# Patient Record
Sex: MALE | Race: WHITE | NOT HISPANIC OR LATINO | Employment: UNEMPLOYED | URBAN - METROPOLITAN AREA
[De-identification: names, ages, dates, MRNs, and addresses within clinical notes are randomized per-mention and may not be internally consistent; named-entity substitution may affect disease eponyms.]

---

## 2017-01-16 ENCOUNTER — OFFICE VISIT (OUTPATIENT)
Dept: OCCUPATIONAL MEDICINE | Facility: CLINIC | Age: 62
End: 2017-01-16
Payer: OTHER MISCELLANEOUS

## 2017-01-16 ENCOUNTER — HOSPITAL ENCOUNTER (OUTPATIENT)
Dept: RADIOLOGY | Facility: CLINIC | Age: 62
Discharge: HOME | End: 2017-01-16
Payer: OTHER MISCELLANEOUS

## 2017-01-16 ENCOUNTER — TRANSCRIBE ORDERS (OUTPATIENT)
Dept: OCCUPATIONAL MEDICINE | Facility: CLINIC | Age: 62
End: 2017-01-16

## 2017-01-16 DIAGNOSIS — M54.50 ACUTE MIDLINE LOW BACK PAIN WITHOUT SCIATICA: ICD-10-CM

## 2017-01-16 DIAGNOSIS — M54.50 ACUTE MIDLINE LOW BACK PAIN WITHOUT SCIATICA: Primary | ICD-10-CM

## 2017-01-16 PROCEDURE — 72100 X-RAY EXAM L-S SPINE 2/3 VWS: CPT

## 2017-01-16 PROCEDURE — 99203 OFFICE O/P NEW LOW 30 MIN: CPT

## 2017-01-18 ENCOUNTER — APPOINTMENT (OUTPATIENT)
Dept: OCCUPATIONAL MEDICINE | Facility: CLINIC | Age: 62
End: 2017-01-18
Payer: OTHER MISCELLANEOUS

## 2017-01-18 PROCEDURE — 99213 OFFICE O/P EST LOW 20 MIN: CPT

## 2018-03-15 ENCOUNTER — APPOINTMENT (OUTPATIENT)
Dept: URGENT CARE | Facility: CLINIC | Age: 63
End: 2018-03-15
Payer: OTHER MISCELLANEOUS

## 2018-03-15 PROCEDURE — G0382 LEV 3 HOSP TYPE B ED VISIT: HCPCS | Performed by: EMERGENCY MEDICINE

## 2018-03-15 PROCEDURE — 99283 EMERGENCY DEPT VISIT LOW MDM: CPT | Performed by: EMERGENCY MEDICINE

## 2018-03-20 ENCOUNTER — APPOINTMENT (OUTPATIENT)
Dept: URGENT CARE | Facility: CLINIC | Age: 63
End: 2018-03-20
Payer: OTHER MISCELLANEOUS

## 2018-03-20 PROCEDURE — 99213 OFFICE O/P EST LOW 20 MIN: CPT

## 2020-09-18 ENCOUNTER — HOSPITAL ENCOUNTER (EMERGENCY)
Facility: HOSPITAL | Age: 65
Discharge: HOME | End: 2020-09-18
Attending: EMERGENCY MEDICINE | Admitting: EMERGENCY MEDICINE
Payer: MEDICARE

## 2020-09-18 VITALS
WEIGHT: 200 LBS | SYSTOLIC BLOOD PRESSURE: 159 MMHG | OXYGEN SATURATION: 98 % | HEART RATE: 70 BPM | RESPIRATION RATE: 18 BRPM | DIASTOLIC BLOOD PRESSURE: 89 MMHG | TEMPERATURE: 97.8 F

## 2020-09-18 DIAGNOSIS — R41.3 MEMORY LOSS: ICD-10-CM

## 2020-09-18 DIAGNOSIS — S60.512A ABRASION OF LEFT HAND, INITIAL ENCOUNTER: ICD-10-CM

## 2020-09-18 DIAGNOSIS — F20.9 SCHIZOPHRENIA (HCC): ICD-10-CM

## 2020-09-18 DIAGNOSIS — R45.1 AGITATION: Primary | ICD-10-CM

## 2020-09-18 LAB
ALBUMIN SERPL BCP-MCNC: 3.6 G/DL (ref 3.5–5)
ALP SERPL-CCNC: 59 U/L (ref 46–116)
ALT SERPL W P-5'-P-CCNC: 53 U/L (ref 12–78)
AMPHETAMINES SERPL QL SCN: NEGATIVE
ANION GAP SERPL CALCULATED.3IONS-SCNC: 2 MMOL/L (ref 4–13)
AST SERPL W P-5'-P-CCNC: 32 U/L (ref 5–45)
BARBITURATES UR QL: NEGATIVE
BASOPHILS # BLD AUTO: 0.01 THOUSANDS/ΜL (ref 0–0.1)
BASOPHILS NFR BLD AUTO: 0 % (ref 0–1)
BENZODIAZ UR QL: NEGATIVE
BILIRUB SERPL-MCNC: 0.4 MG/DL (ref 0.2–1)
BUN SERPL-MCNC: 14 MG/DL (ref 5–25)
CALCIUM SERPL-MCNC: 8.8 MG/DL (ref 8.3–10.1)
CHLORIDE SERPL-SCNC: 107 MMOL/L (ref 100–108)
CLARITY, POC: CLEAR
CO2 SERPL-SCNC: 32 MMOL/L (ref 21–32)
COCAINE UR QL: NEGATIVE
COLOR, POC: YELLOW
CREAT SERPL-MCNC: 1.08 MG/DL (ref 0.6–1.3)
EOSINOPHIL # BLD AUTO: 0 THOUSAND/ΜL (ref 0–0.61)
EOSINOPHIL NFR BLD AUTO: 0 % (ref 0–6)
ERYTHROCYTE [DISTWIDTH] IN BLOOD BY AUTOMATED COUNT: 13.7 % (ref 11.6–15.1)
ETHANOL SERPL-MCNC: <3 MG/DL (ref 0–3)
EXT BILIRUBIN, UA: NEGATIVE
EXT BLOOD URINE: NEGATIVE
EXT GLUCOSE, UA: NEGATIVE
EXT KETONES: NEGATIVE
EXT NITRITE, UA: NEGATIVE
EXT PH, UA: 6.5
EXT PROTEIN, UA: NEGATIVE
EXT SPECIFIC GRAVITY, UA: 1.01
EXT UROBILINOGEN: 0.2
GFR SERPL CREATININE-BSD FRML MDRD: 72 ML/MIN/1.73SQ M
GLUCOSE SERPL-MCNC: 75 MG/DL (ref 65–140)
HCT VFR BLD AUTO: 41.6 % (ref 36.5–49.3)
HGB BLD-MCNC: 13.9 G/DL (ref 12–17)
IMM GRANULOCYTES # BLD AUTO: 0 THOUSAND/UL (ref 0–0.2)
IMM GRANULOCYTES NFR BLD AUTO: 0 % (ref 0–2)
LYMPHOCYTES # BLD AUTO: 0.95 THOUSANDS/ΜL (ref 0.6–4.47)
LYMPHOCYTES NFR BLD AUTO: 26 % (ref 14–44)
MCH RBC QN AUTO: 31.3 PG (ref 26.8–34.3)
MCHC RBC AUTO-ENTMCNC: 33.4 G/DL (ref 31.4–37.4)
MCV RBC AUTO: 94 FL (ref 82–98)
METHADONE UR QL: NEGATIVE
MONOCYTES # BLD AUTO: 0.28 THOUSAND/ΜL (ref 0.17–1.22)
MONOCYTES NFR BLD AUTO: 8 % (ref 4–12)
NEUTROPHILS # BLD AUTO: 2.38 THOUSANDS/ΜL (ref 1.85–7.62)
NEUTS SEG NFR BLD AUTO: 66 % (ref 43–75)
OPIATES UR QL SCN: NEGATIVE
OXYCODONE+OXYMORPHONE UR QL SCN: NEGATIVE
PCP UR QL: NEGATIVE
PLATELET # BLD AUTO: 190 THOUSANDS/UL (ref 149–390)
PMV BLD AUTO: 9 FL (ref 8.9–12.7)
POTASSIUM SERPL-SCNC: 4.1 MMOL/L (ref 3.5–5.3)
PROT SERPL-MCNC: 6.2 G/DL (ref 6.4–8.2)
RBC # BLD AUTO: 4.44 MILLION/UL (ref 3.88–5.62)
SODIUM SERPL-SCNC: 141 MMOL/L (ref 136–145)
THC UR QL: NEGATIVE
TSH SERPL DL<=0.05 MIU/L-ACNC: 0.47 UIU/ML (ref 0.36–3.74)
WBC # BLD AUTO: 3.62 THOUSAND/UL (ref 4.31–10.16)
WBC # BLD EST: NEGATIVE 10*3/UL

## 2020-09-18 PROCEDURE — 90471 IMMUNIZATION ADMIN: CPT

## 2020-09-18 PROCEDURE — 84443 ASSAY THYROID STIM HORMONE: CPT | Performed by: EMERGENCY MEDICINE

## 2020-09-18 PROCEDURE — 85025 COMPLETE CBC W/AUTO DIFF WBC: CPT | Performed by: EMERGENCY MEDICINE

## 2020-09-18 PROCEDURE — 80320 DRUG SCREEN QUANTALCOHOLS: CPT | Performed by: EMERGENCY MEDICINE

## 2020-09-18 PROCEDURE — 93005 ELECTROCARDIOGRAM TRACING: CPT

## 2020-09-18 PROCEDURE — 99285 EMERGENCY DEPT VISIT HI MDM: CPT

## 2020-09-18 PROCEDURE — 80307 DRUG TEST PRSMV CHEM ANLYZR: CPT | Performed by: EMERGENCY MEDICINE

## 2020-09-18 PROCEDURE — 36415 COLL VENOUS BLD VENIPUNCTURE: CPT | Performed by: EMERGENCY MEDICINE

## 2020-09-18 PROCEDURE — 90715 TDAP VACCINE 7 YRS/> IM: CPT | Performed by: EMERGENCY MEDICINE

## 2020-09-18 PROCEDURE — 80053 COMPREHEN METABOLIC PANEL: CPT | Performed by: EMERGENCY MEDICINE

## 2020-09-18 PROCEDURE — 99285 EMERGENCY DEPT VISIT HI MDM: CPT | Performed by: EMERGENCY MEDICINE

## 2020-09-18 RX ORDER — CLONAZEPAM 1 MG/1
1 TABLET ORAL 3 TIMES DAILY
COMMUNITY

## 2020-09-18 RX ORDER — DONEPEZIL HYDROCHLORIDE 10 MG/1
5 TABLET, FILM COATED ORAL DAILY
COMMUNITY

## 2020-09-18 RX ORDER — QUETIAPINE FUMARATE 100 MG/1
100 TABLET, FILM COATED ORAL EVERY MORNING
COMMUNITY

## 2020-09-18 RX ORDER — QUETIAPINE FUMARATE 100 MG/1
400 TABLET, FILM COATED ORAL
COMMUNITY

## 2020-09-18 RX ORDER — GINSENG 100 MG
1 CAPSULE ORAL ONCE
Status: COMPLETED | OUTPATIENT
Start: 2020-09-18 | End: 2020-09-18

## 2020-09-18 RX ADMIN — BACITRACIN 1 SMALL APPLICATION: 500 OINTMENT TOPICAL at 17:22

## 2020-09-18 RX ADMIN — TETANUS TOXOID, REDUCED DIPHTHERIA TOXOID AND ACELLULAR PERTUSSIS VACCINE, ADSORBED 0.5 ML: 5; 2.5; 8; 8; 2.5 SUSPENSION INTRAMUSCULAR at 17:22

## 2020-09-18 NOTE — ED NOTES
Pt is a 71 y o  male who was brought to the ED with his wife due to worsening aggression and confusion  Patient's wife, Mitchell Martini, provided information at this time as she is the one looking for resources  Mitchell Martini states that over the past year, patient has become more irritable, forgetful and confused  She expressed feeling as though she is unable to continue to care for him because of his mood swings in which he will yell at her and "go into a rage"  She states that patient met with neurology a year ago and was told he has short-term memory loss but there have been no continued follow-ups regarding patient's deteriorating state  Mitchell Martini described several instances in which patient would forget something or not make sense of something and then they would begin to yell at each other  She relays frustration with patient and the situation, and states that patient keeps telling her he is going to change, but that he never does  She also reports that patient becomes paranoid that there is a man in the house after Mitchell Martini and is also preoccupied with the keys for the shed and safety box  Mitchell Martini reports constantly following behind patient to make sure he isn't getting rid of important things or doing something unsafe  Mitchell Martini states that patient [facility]  She reports prior inpatient admissions and suicidal ideations, however it has been many years since then  Patient's psychiatrist recently increased his dose of Seroquel, and Judy relates that will start tomorrow  Mitchell Martini feels that since patient retired, two years ago, his moods have been worse because he isn't keeping busy  She reports patient is sleeping and eating well  She denies any recent suicidal thoughts  She denies patient making any homicidal threats  She relates feeling exhausted and unable to keep caring for patient's needs at this time   Elmajoão Martini was agreeable to be referred to in-home nursing and discuss with existing providers the [facility] steps to potentially get patient into a more supportive living environment  Chief Complaint   Patient presents with    Behavior Problem     pt's wife reports that patient has been aggressive towards her the past few weeks  has been seen at [facility] and right now are not doing in person visits   breejosepn's wife states he has been having increasing confusion, and aggression     Intake Assessment completed, Safety risk Assessment completed    SHILPI Oliveros  09/18/20   [id number]

## 2020-09-18 NOTE — ED PROVIDER NOTES
History  Chief Complaint   Patient presents with    Behavior Problem     pt's wife reports that patient has been aggressive towards her the past few weeks  [facility] and right now are not doing in person visits  joana's wife states he has been having increasing confusion, and aggression     Patient with pmh short-term memory loss, schizophrenia, brought in by wife with concern for increased agitation over last few months  He starts banging on the car and denting it  He argues with her, he is hallucinating men in the house with them  Denies SI/ HI  Psychiatrist increased seroquel but has yet to start that  Prior to Admission Medications   Prescriptions Last Dose Informant Patient Reported? Taking? QUEtiapine (SEROquel) 100 mg tablet   Yes Yes   Sig: Take 100 mg by mouth every morning   QUEtiapine (SEROquel) 100 mg tablet   Yes Yes   Sig: Take 400 mg by mouth daily at bedtime    clonazePAM (KlonoPIN) 1 mg tablet   Yes Yes   Sig: Take 1 mg by mouth 3 (three) times a day   donepezil (ARICEPT) 10 mg tablet   Yes Yes   Sig: Take 5 mg by mouth daily      Facility-Administered Medications: None       Past Medical History:   Diagnosis Date    Schizophrenia [facility])        Past Surgical History:   Procedure Laterality Date    BACK SURGERY         History reviewed  No pertinent family history  I have reviewed and agree with the history as documented  E-Cigarette/Vaping     E-Cigarette/Vaping Substances     Social History     Tobacco Use    Smoking status: Former Smoker    Smokeless tobacco: Never Used   Substance Use Topics    Alcohol use: Not Currently    Drug use: Not Currently       Review of Systems   Unable to perform ROS: Psychiatric disorder   All other systems reviewed and are negative  Physical Exam  Physical Exam  Vitals signs and nursing note reviewed  Constitutional:       Appearance: He is well-developed     Eyes:      Conjunctiva/sclera: Conjunctivae normal  Pupils: Pupils are equal, round, and reactive to light  Neck:      Musculoskeletal: Normal range of motion and neck supple  No spinous process tenderness  Cardiovascular:      Rate and Rhythm: Normal rate and regular rhythm  Heart sounds: Normal heart sounds  Pulmonary:      Effort: Pulmonary effort is normal  No respiratory distress  Breath sounds: Normal breath sounds  No wheezing  Abdominal:      General: Bowel sounds are normal  There is no distension  Palpations: Abdomen is soft  Tenderness: There is no abdominal tenderness  Musculoskeletal: Normal range of motion  Arms:    Skin:     General: Skin is warm and dry  Findings: No rash  Neurological:      Mental Status: He is alert  He is disoriented  GCS: GCS eye subscore is 4  GCS verbal subscore is 5  GCS motor subscore is 6  Sensory: No sensory deficit  Psychiatric:         Attention and Perception: Attention normal          Mood and Affect: Mood normal          Speech: Speech normal          Behavior: Behavior normal  Behavior is cooperative  Thought Content: Thought content is not paranoid  Thought content does not include homicidal or suicidal ideation  Cognition and Memory: Memory is impaired  He exhibits impaired recent memory           Judgment: Judgment normal          Vital Signs  ED Triage Vitals [09/18/20 1346]   Temperature Pulse Respirations Blood Pressure SpO2   97 8 °F (36 6 °C) 72 18 156/96 97 %      Temp Source Heart Rate Source Patient Position - Orthostatic VS BP Location FiO2 (%)   Temporal Monitor Lying Right arm --      Pain Score       --           Vitals:    09/18/20 1346 09/18/20 1727   BP: 156/96 159/89   Pulse: 72 70   Patient Position - Orthostatic VS: Lying Lying         Visual Acuity      ED Medications  Medications   bacitracin topical ointment 1 small application (1 small application Topical Given 9/18/20 1722)   tetanus-diphtheria-acellular pertussis (BOOSTRIX) IM injection 0 5 mL (0 5 mL Intramuscular Given 9/18/20 1722)       Diagnostic Studies  Results Reviewed     Procedure Component Value Units Date/Time    POCT urinalysis dipstick [[id number]]  (Normal) Resulted:  09/18/20 1601    Lab Status:  Final result Updated:  09/18/20 1602     Color, UA yellow     Clarity, UA clear     Glucose, UA (Ref: Negative) negative     Bilirubin, UA (Ref: Negative) negative     Ketones, UA (Ref: Negative) negative     Spec Grav, UA (Ref:1 003-1 030) 1 010     Blood, UA (Ref: Negative) negative     pH, UA (Ref: 4 5-8 0) 6 5     Protein, UA (Ref: Negative) negative     Urobilinogen, UA (Ref: 0 2- 1 0) 0 2      Leukocytes, UA (Ref: Negative) negative     Nitrite, UA (Ref: Negative) negative    Comprehensive metabolic panel [[id number]]  (Abnormal) Collected:  09/18/20 1443    Lab Status:  Final result Specimen:  Blood from Arm, Right Updated:  09/18/20 1554     Sodium 141 mmol/L      Potassium 4 1 mmol/L      Chloride 107 mmol/L      CO2 32 mmol/L      ANION GAP 2 mmol/L      BUN 14 mg/dL      Creatinine 1 08 mg/dL      Glucose 75 mg/dL      Calcium 8 8 mg/dL      AST 32 U/L      ALT 53 U/L      Alkaline Phosphatase 59 U/L      Total Protein 6 2 g/dL      Albumin 3 6 g/dL      Total Bilirubin 0 40 mg/dL      eGFR 72 ml/min/1 73sq m     Narrative:       National Kidney Disease Foundation guidelines for Chronic Kidney Disease (CKD):     Stage 1 with normal or high GFR (GFR > 90 mL/min/1 73 square meters)    Stage 2 Mild CKD (GFR = 60-89 mL/min/1 73 square meters)    Stage 3A Moderate CKD (GFR = 45-59 mL/min/1 73 square meters)    Stage 3B Moderate CKD (GFR = 30-44 mL/min/1 73 square meters)    Stage 4 Severe CKD (GFR = 15-29 mL/min/1 73 square meters)    Stage 5 End Stage CKD (GFR <15 mL/min/1 73 square meters)  Note: GFR calculation is accurate only with a steady state creatinine    Ethanol [[id number]]  (Normal) Collected:  09/18/20 1448    Lab Status:  Final result Specimen: Blood from Arm, Right Updated:  09/18/20 1554     Ethanol Lvl <3 mg/dL     Rapid drug screen, urine [[id number]]  (Normal) Collected:  09/18/20 1500    Lab Status:  Final result Specimen:  Urine, Clean Catch Updated:  09/18/20 1537     Amph/Meth UR Negative     Barbiturate Ur Negative     Benzodiazepine Urine Negative     Cocaine Urine Negative     Methadone Urine Negative     Opiate Urine Negative     PCP Ur Negative     THC Urine Negative     Oxycodone Urine Negative    Narrative:       FOR MEDICAL PURPOSES ONLY  IF CONFIRMATION NEEDED PLEASE CONTACT THE LAB WITHIN 5 DAYS  Drug Screen Cutoff Levels:  AMPHETAMINE/METHAMPHETAMINES  1000 ng/mL  BARBITURATES     200 ng/mL  BENZODIAZEPINES     200 ng/mL  COCAINE      300 ng/mL  METHADONE      300 ng/mL  OPIATES      300 ng/mL  PHENCYCLIDINE     25 ng/mL  THC       50 ng/mL  OXYCODONE      100 ng/mL    TSH [[id number]]  (Normal) Collected:  09/18/20 1448    Lab Status:  Final result Specimen:  Blood from Arm, Right Updated:  09/18/20 1521     TSH 3RD GENERATON 0 472 uIU/mL     Narrative:       Patients undergoing fluorescein dye angiography may retain small amounts of fluorescein in the body for 48-72 hours post procedure  Samples containing fluorescein can produce falsely depressed TSH values  If the patient had this procedure,a specimen should be resubmitted post fluorescein clearance        CBC and differential [[id number]]  (Abnormal) Collected:  09/18/20 1448    Lab Status:  Final result Specimen:  Blood from Arm, Right Updated:  09/18/20 1456     WBC 3 62 Thousand/uL      RBC 4 44 Million/uL      Hemoglobin 13 9 g/dL      Hematocrit 41 6 %      MCV 94 fL      MCH 31 3 pg      MCHC 33 4 g/dL      RDW 13 7 %      MPV 9 0 fL      Platelets 230 Thousands/uL      Neutrophils Relative 66 %      Immat GRANS % 0 %      Lymphocytes Relative 26 %      Monocytes Relative 8 %      Eosinophils Relative 0 %      Basophils Relative 0 %      Neutrophils Absolute 2 38 Thousands/µL      Immature Grans Absolute 0 00 Thousand/uL      Lymphocytes Absolute 0 95 Thousands/µL      Monocytes Absolute 0 28 Thousand/µL      Eosinophils Absolute 0 00 Thousand/µL      Basophils Absolute 0 01 Thousands/µL                  No orders to display              Procedures  ECG 12 Lead Documentation Only    Date/Time: 9/18/2020 3:12 PM  Performed by: Allison Urbano DO  Authorized by: Allison Urbano DO     Indications / Diagnosis:  Agitation  ECG reviewed by me, the ED Provider: yes    Patient location:  ED  Previous ECG:     Previous ECG:  Unavailable  Interpretation:     Interpretation: non-specific    Rate:     ECG rate:  69    ECG rate assessment: normal    Rhythm:     Rhythm: sinus rhythm    Ectopy:     Ectopy: PAC    QRS:     QRS axis:  Normal    QRS intervals:  Normal  Conduction:     Conduction: normal    ST segments:     ST segments:  Normal  T waves:     T waves: normal               ED Course  ED Course as of Sep 18 1851   Fri Sep 18, 2020   1612 Texted [facility]      1614 Someone will call here shortly from behavioral health      1221 Odalis Hunter from crisis will call to speak to patient and wife - I don't think any reason to commit patient at this point - he is calm and cooperative  Wife just gave him another dose of clonazepam                               SBIRT 22yo+      Most Recent Value   SBIRT (24 yo +)   In order to provide better care to our patients, we are screening all of our patients for alcohol and drug use  Would it be okay to ask you these screening questions? Yes Filed at: 09/18/2020 1349   Initial Alcohol Screen: US AUDIT-C    1  How often do you have a drink containing alcohol?  0 Filed at: 09/18/2020 1349   2  How many drinks containing alcohol do you have on a typical day you are drinking? 0 Filed at: 09/18/2020 1349   3a  Male UNDER 65: How often do you have five or more drinks on one occasion? 0 Filed at: 09/18/2020 1349   3b  FEMALE Any Age, or MALE 65+:  How often do you have 4 or more drinks on one occassion? 0 Filed at: 09/18/2020 1349   Audit-C Score  0 Filed at: 09/18/2020 1349   GONZALEZ: How many times in the past year have you    Used an illegal drug or used a prescription medication for non-medical reasons? Never Filed at: 09/18/2020 1349                  [facility]  Number of Diagnoses or Management Options  Abrasion of left hand, initial encounter: new and does not require workup  Agitation: new and requires workup  Memory loss: established and worsening  [facility] ): established and worsening     Amount and/or Complexity of Data Reviewed  Clinical lab tests: ordered and reviewed  Obtain history from someone other than the patient: yes    Patient Progress  Patient progress: improved      Disposition  Final diagnoses:   Agitation   Schizophrenia ([facility] )   Memory loss   Abrasion of left hand, initial encounter     Time reflects when diagnosis was documented in both MDM as applicable and the Disposition within this note     Time User Action Codes Description Comment    9/18/2020  5:13 PM Jasmyn Hoops Add [R45 1] Agitation     9/18/2020  5:13 PM Jasmyn Hoops Add [F20 9] Schizophrenia ([facility] )     9/18/2020  5:13 PM Jasmyn Hoops Add [R41 3] Memory loss     9/18/2020  5:13 PM Gisel Zabala Abrasion of left hand, initial encounter       ED Disposition     ED Disposition Condition Date/Time Comment    Discharge Stable Fri Sep 18, 2020  5:13 PM Kimberlyn Cali discharge to home/self care              MD Documentation      Most Recent Value   Sending MD Dr Nickolas Eagle up With Specialties Details Why Contact Info    Referral Self  Call in 3 days  [address]            Discharge Medication List as of 9/18/2020  5:15 PM      CONTINUE these medications which have NOT CHANGED    Details   clonazePAM (KlonoPIN) 1 mg tablet Take 1 mg by mouth 3 (three) times a day, Historical Med      donepezil (ARICEPT) 10 mg tablet Take 5 mg by mouth daily, Historical Med      !! QUEtiapine (SEROquel) 100 mg tablet Take 100 mg by mouth every morning, Historical Med      !! QUEtiapine (SEROquel) 100 mg tablet Take 100 mg by mouth daily at bedtime, Historical Med       !! - Potential duplicate medications found  Please discuss with provider              PDMP Review     None          ED Provider  Electronically Signed by           Sonya Blakely DO  09/18/20 3152

## 2020-09-20 LAB
ATRIAL RATE: 74 BPM
P AXIS: 70 DEGREES
PR INTERVAL: 110 MS
QRS AXIS: 16 DEGREES
QRSD INTERVAL: 88 MS
QT INTERVAL: 432 MS
QTC INTERVAL: 462 MS
T WAVE AXIS: 66 DEGREES
VENTRICULAR RATE: 69 BPM

## 2020-09-20 PROCEDURE — 93010 ELECTROCARDIOGRAM REPORT: CPT | Performed by: INTERNAL MEDICINE

## 2022-10-10 PROBLEM — F20.0 PARANOID SCHIZOPHRENIA (HCC): Status: ACTIVE | Noted: 2022-10-10

## 2022-12-07 ENCOUNTER — TELEMEDICINE (OUTPATIENT)
Dept: PSYCHIATRY | Facility: CLINIC | Age: 67
End: 2022-12-07

## 2022-12-07 DIAGNOSIS — F20.0 PARANOID SCHIZOPHRENIA, CHRONIC CONDITION (HCC): Primary | ICD-10-CM

## 2022-12-07 RX ORDER — TRAZODONE HYDROCHLORIDE 100 MG/1
TABLET ORAL
Qty: 90 TABLET | Refills: 1 | Status: SHIPPED | OUTPATIENT
Start: 2022-12-07

## 2022-12-07 RX ORDER — CLONAZEPAM 1 MG/1
TABLET ORAL
Qty: 60 TABLET | Refills: 4 | Status: SHIPPED | OUTPATIENT
Start: 2022-12-07

## 2022-12-07 RX ORDER — QUETIAPINE FUMARATE 100 MG/1
TABLET, FILM COATED ORAL
Qty: 180 TABLET | Refills: 1 | Status: SHIPPED | OUTPATIENT
Start: 2022-12-07

## 2022-12-07 RX ORDER — QUETIAPINE FUMARATE 300 MG/1
TABLET, FILM COATED ORAL
Qty: 180 TABLET | Refills: 1 | Status: SHIPPED | OUTPATIENT
Start: 2022-12-07

## 2022-12-07 NOTE — PSYCH
Virtual Regular Visit    Verification of patient location:at home    Patient is located in the following state in which I hold an active license [place]      Assessment/Plan:       Diagnoses and all orders for this visit:    Paranoid schizophrenia, chronic condition ([facility] )  -     traZODone (DESYREL) 100 mg tablet; Take 1/2 to 1 PO HS PRN  -     QUEtiapine (SEROquel) 300 mg tablet; Take 2 PO Q HS  -     QUEtiapine (SEROquel) 100 mg tablet; Take 1 PO BID  -     clonazePAM (KlonoPIN) 1 mg tablet; Take 1 PO BID          Goals addressed in session:   Good health  Counseling provided:      Treatment Recommendations- Risks Benefits       Immediate Medical/Psychiatric/Psychotherapy Treatments and Any Precautions:     Risks, Benefits And Possible Side Effects Of Medications:  Risks, benefits, and possible side effects of medications explained to patient and patient verbalizes understanding    Controlled Medication Discussion: No records found for controlled prescriptions according to [facility] [address]      Reason for visit is No chief complaint on file  Medication Management     Encounter provider CATE Rea    [place] [address] [place] [address]  [phone]      Recent Visits  No visits were found meeting these conditions  Showing recent visits within past 7 days and meeting all other requirements  Today's Visits  Date Type Provider Dept   12/07/22 Telemedicine Kelsey Whatley [facility] today's visits and meeting all other requirements  Future Appointments  No visits were found meeting these conditions  Showing future appointments within next 150 days and meeting all other requirements       The patient was identified by name and date of birth  Soda Springsterrell Brown was informed that this is a telemedicine visit and that the visit is being conducted throughTelephone  My office door was closed  No one else was in the room  He acknowledged consent and understanding of privacy and security of the video platform  The patient has agreed to participate and understands they can discontinue the visit at any time  Patient is aware this is a billable service  Subjective    Sharyn Streeter is a 75 y o  male    here today for a med check  This was via phone as they could connect to [employer]  normal appetite      HPI Mood "so so" "pretty good"   Anxiety manageable  Denies Psychotic Symptoms  No problems with mediation  Appetite Sleep good  Health OK  Denies SI/HI    Past Medical History:   Diagnosis Date   • Schizophrenia ([facility] )        Past Surgical History:   Procedure Laterality Date   • BACK SURGERY         Current Outpatient Medications   Medication Sig Dispense Refill   • clonazePAM (KlonoPIN) 1 mg tablet Take 1 PO BID 60 tablet 4   • QUEtiapine (SEROquel) 100 mg tablet Take 1 PO  tablet 1   • QUEtiapine (SEROquel) 300 mg tablet Take 2 PO Q  tablet 1   • traZODone (DESYREL) 100 mg tablet Take 1/2 to 1 PO HS PRN 90 tablet 1   • donepezil (ARICEPT) 10 mg tablet Take 5 mg by mouth daily       No current facility-administered medications for this visit  No Known Allergies    Social History     Substance and Sexual Activity   Drug Use Not Currently       No family history on file          Objective    Mental status:  Appearance calm and cooperative  and adequate hygiene and grooming   Mood mood appropriate   Affect affect appropriate    Speech a normal rate and fluent   Thought Processes normal thought processes   Hallucinations no hallucinations present    Thought Content no delusions   Abnormal Thoughts no suicidal thoughts  and no homicidal thoughts    Orientation  oriented to person and place and time   Remote Memory short term memory intact and long term memory intact   Attention Span concentration intact   Intellect Appears to be of Average Intelligence   Insight Limited insight   Judgement judgment was limited   Muscle Strength Muscle strength and tone were normal and Normal gait    Language no difficulty naming common objects   Fund of Knowledge displays adequate knowledge of current events   Pain none   Pain Scale 0       Video Exam    There were no vitals filed for this visit      I spent 15 minutes directly with the patient during this visit    Patient Instructions   Continue Current Tx  Connect to My Chart  Report Problems  Return 4 months       Visit Time    Visit Start Time: 2317  Visit Stop Time: 1450  Total Visit Duration: 17 min

## 2023-03-29 ENCOUNTER — TELEMEDICINE (OUTPATIENT)
Dept: PSYCHIATRY | Facility: CLINIC | Age: 68
End: 2023-03-29

## 2023-03-29 DIAGNOSIS — F20.0 PARANOID SCHIZOPHRENIA, CHRONIC CONDITION (HCC): Primary | ICD-10-CM

## 2023-03-29 RX ORDER — CLONAZEPAM 1 MG/1
TABLET ORAL
Qty: 60 TABLET | Refills: 3 | Status: SHIPPED | OUTPATIENT
Start: 2023-03-29

## 2023-03-29 RX ORDER — QUETIAPINE FUMARATE 300 MG/1
TABLET, FILM COATED ORAL
Qty: 180 TABLET | Refills: 1 | Status: SHIPPED | OUTPATIENT
Start: 2023-03-29

## 2023-03-29 RX ORDER — QUETIAPINE FUMARATE 100 MG/1
TABLET, FILM COATED ORAL
Qty: 180 TABLET | Refills: 1 | Status: SHIPPED | OUTPATIENT
Start: 2023-03-29

## 2023-03-29 RX ORDER — AMITRIPTYLINE HYDROCHLORIDE 100 MG/1
TABLET, FILM COATED ORAL
Qty: 90 TABLET | Refills: 1 | Status: SHIPPED | OUTPATIENT
Start: 2023-03-29

## 2023-03-29 NOTE — BH TREATMENT PLAN
TREATMENT PLAN (Medication Management Only)        [address]    Name and Date of Birth:  Otilio Sicard 75 y o  1955  Date of Treatment Plan: March 29, 2023  Diagnosis/Diagnoses:    1  Paranoid schizophrenia, chronic condition [facility])      Strengths/Personal Resources for Self-Care: supportive family, supportive friends, taking medications as prescribed  Area/Areas of need (in own words): hallucinations, paranoid thoughts  1  Long Term Goal: improve control of psychotic symptoms  Target Date:6 months - 9/29/2023  Person/Persons responsible for completion of goal: family Oleksandr  2  Short Term Objective (s) - How will we reach this goal?:   A  Provider new recommended medication/dosage changes and/or continue medication(s): continue current medications as prescribed AmitriptylineElavil, Seroquel, Klonopin  B  N/A   C  N/A  Target Date:6 months - 9/29/2023  Person/Persons Responsible for Completion of Goal: family Oleksandr  Progress Towards Goals: continuing treatment  Treatment Modality: medication management every 3 months  Review due 180 days from date of this plan: 6 months - 9/29/2023  Expected length of service: ongoing treatment  My Physician/PA/NP and I have developed this plan together and I agree to work on the goals and objectives  I understand the treatment goals that were developed for my treatment

## 2023-03-29 NOTE — PATIENT INSTRUCTIONS
Continue Tx  D/C Trazodone  Start Elavil PRN  Report Problems  Return 3 months  F/U with medical care

## 2023-06-12 ENCOUNTER — TELEPHONE (OUTPATIENT)
Dept: PSYCHIATRY | Facility: CLINIC | Age: 68
End: 2023-06-12

## 2023-06-12 NOTE — TELEPHONE ENCOUNTER
Patients wife called to cancel with appointment, she is having surgery and will be in rehab  While she is there he will be at a assited Living facility     Kristal Massey8  P: [phone]    Waiting on provider for approval for medication and medical records to be released once he is there  He will also need to be r/s once wife is home

## 2023-06-21 ENCOUNTER — DOCUMENTATION (OUTPATIENT)
Dept: PSYCHIATRY | Facility: CLINIC | Age: 68
End: 2023-06-21

## 2023-06-21 NOTE — PSYCH
[facility]    Patient Name Phillip Avery     Date of Birth: 75 y o  1955      MRN: [id number]    Admission Date: several years ago    Date of Transfer: June 21, 2023    Admission Diagnosis:     Schizophrenia    Current Diagnosis:     No diagnosis found  Reason for Admission: Eva Mcgarry presented for treatment due to psychotic symptoms  Primary complaints included PSYCHOTIC SYMPTOMS: unremarkable  Progress in Treatment: Eva Mcgarry was seen for Medication Management  During the course of treatment he  staes mood is good  Wife says so so  Psychotic Symptoms occur at night when trying to go to sleep    Episodes of Higher Level of Care: No    Transfer request Initiated by: Psychiatrist: Nurse Practitioner Freeman Reis Therapist: None    Reason for Transfer Request: clinician leaving practice    Does this individual need a clinician with specialized training/expertise?: No    Is this client working with any other \[facility]\"" Providers/Therapists?  Psychiatrist: None Therapist: None    Other pertinent issues: None    Are there any specific individuals who would be a “best fit” or who have already agreed to accept this transfer request?      Psychiatrist: None   Therapist: None  Rationale: Not Applicable    Attempts to maintain the current therapeutic relationship: Not Applicable    Transfer request routed to Clinical Coordinator for input and/or approval      Comments from other involved providers and/or clinical coordinator: None    ERIC MazariegosNP06/21/23

## 2023-06-22 ENCOUNTER — DOCUMENTATION (OUTPATIENT)
Dept: PSYCHIATRY | Facility: CLINIC | Age: 68
End: 2023-06-22

## 2023-06-22 NOTE — PSYCH
"[facility]    Patient Name Gianni Marie     Date of Birth: 75 y o  1955      MRN: [id number]    Admission Date: several years ago    Date of Transfer: June 22, 2023    Admission Diagnosis:     Schizophrenia    Current Diagnosis:     No diagnosis found  Reason for Admission: Beverly Galeana presented for treatment due to mood instability and psychotic symptoms  Primary complaints included MOOD INSTABILITY SYMPTOMS: unremarkable and PSYCHOTIC SYMPTOMS: unremarkable  Progress in Treatment: Beverly Galeana was seen for Medication Management  During the course of treatment he states mood good  Wife said \"so so\"  Staes Psychotic symptoms occur at night before he goes to sleep  Episodes of Higher Level of Care: No    Transfer request Initiated by: Psychiatrist: Nurse Practitioner Jennifer Dubose Therapist: None    Reason for Transfer Request: clinician leaving practice    Does this individual need a clinician with specialized training/expertise?: No    Is this client working with any other [facility] Providers/Therapists?  Psychiatrist: None Therapist: None    Other pertinent issues: None    Are there any specific individuals who would be a “best fit” or who have already agreed to accept this transfer request?      Psychiatrist: None   Therapist: None  Rationale: Not Applicable    Attempts to maintain the current therapeutic relationship: Not Applicable    Transfer request routed to Clinical Coordinator for input and/or approval      Comments from other involved providers and/or clinical coordinator: None    Jennifer Dubose, ERICNP06/22/23     "

## 2023-07-06 ENCOUNTER — TELEPHONE (OUTPATIENT)
Dept: PSYCHIATRY | Facility: CLINIC | Age: 68
End: 2023-07-06

## 2023-07-06 NOTE — TELEPHONE ENCOUNTER
Per Dr Cindy Rowland, I called [facility] Valdez back but spoke with Eren King this time, gave message that we can not give refills as both medications (seroquel and klonopin) are prescribed differently then what we were doing per the medication list she faxed to me    Eren King told me that the medication list came from pt's PCP who refused to give any refills but that their medical director will be there today and they will talk to him again about pt and possible refills as pt's behavior is getting worse, he's running up and down the halls, he's hallucinating, and is more paranoid

## 2023-07-06 NOTE — TELEPHONE ENCOUNTER
After checking the PDMP and calling [facility], I found out that the klonopin hasn't been filled since 3/29 (picked up on 3/31) and a quantity of 60 was given    Carol Harvey from [facility] said that since he arrived on 6/23, they have been  giving it to him TID until he ran out on 7/1. . she put me on hold to go find his bottle and after holding for over 25 minutes, I was hung up on    I will call Carol Harvey back but here's an update thus far

## 2023-07-06 NOTE — TELEPHONE ENCOUNTER
Clt is currently in a respite as sps is unable to care for him. Sps is having surgery and is not aware of how things are going to go when she is able to get out of the rehab that she is currently in.  [facility] No follow up scheduled at this time.

## 2023-07-06 NOTE — TELEPHONE ENCOUNTER
Last prescription for clonazepam (Klonopin) prescribed by José Miguel York was a 30 day supply on 3/29/23 with 3 refills. That should last till 7/27/23. Is there someone who can explain how it ran out by 7/1/23? As for the the quetiapine (Seroquel), it was prescribed 100 mg bid and 300 mg - 2 at hs. A 90 day supply with one refill was sent on the same day for this medication. New symptoms or paranoia could be because of the change in quetiapine (Seroquel) dose. As for the a new clonazepam (Klonopin) prescription, we will need some explanation for the why it ran out early.

## 2023-07-06 NOTE — TELEPHONE ENCOUNTER
Pt is staying at their facility while his wife recovers from surgery, when he got there on 6/23, he had his klonopin but ran out on 7/1, they reached out to his pcp for a refill but it was denied so their medical director d/c the klonopin and changed his seroquel to 100mg 2 tabs bid (at 8am and 2pm) and then 400mg HS, that's what he's been on according to our records but different directions    She said that when he got there, his seroquel directions were 100mg bid (8am and 8pm) along with one 300mg tab HS       She said that they are now seeing a change in him since not having the klonopin, that he's more paranoid and they're hoping that we can prescribe klonopin    Would need to use [facility] in [facility] so that it's delivered to them     Rebel Dodson patient

## 2023-09-28 ENCOUNTER — TELEPHONE (OUTPATIENT)
Dept: PSYCHIATRY | Facility: CLINIC | Age: 68
End: 2023-09-28

## 2023-09-28 NOTE — TELEPHONE ENCOUNTER
Spoke to clients spouse. Clt is in a respite care facility and has dementia. They have moved to [facility] and will seek out a doctor closer to them once he is released. ts chart can be closed.